# Patient Record
Sex: FEMALE | Race: ASIAN | NOT HISPANIC OR LATINO | URBAN - METROPOLITAN AREA
[De-identification: names, ages, dates, MRNs, and addresses within clinical notes are randomized per-mention and may not be internally consistent; named-entity substitution may affect disease eponyms.]

---

## 2022-12-09 ENCOUNTER — EMERGENCY (EMERGENCY)
Facility: HOSPITAL | Age: 27
LOS: 1 days | Discharge: HOME | End: 2022-12-09
Attending: EMERGENCY MEDICINE | Admitting: EMERGENCY MEDICINE
Payer: MEDICAID

## 2022-12-09 VITALS
OXYGEN SATURATION: 100 % | HEART RATE: 102 BPM | RESPIRATION RATE: 17 BRPM | DIASTOLIC BLOOD PRESSURE: 82 MMHG | SYSTOLIC BLOOD PRESSURE: 140 MMHG | TEMPERATURE: 98 F

## 2022-12-09 VITALS
OXYGEN SATURATION: 100 % | SYSTOLIC BLOOD PRESSURE: 136 MMHG | DIASTOLIC BLOOD PRESSURE: 88 MMHG | HEART RATE: 99 BPM | RESPIRATION RATE: 16 BRPM

## 2022-12-09 LAB
ALBUMIN SERPL ELPH-MCNC: 4.3 G/DL — SIGNIFICANT CHANGE UP (ref 3.3–5)
ALP SERPL-CCNC: 80 U/L — SIGNIFICANT CHANGE UP (ref 40–120)
ALT FLD-CCNC: 10 U/L — SIGNIFICANT CHANGE UP (ref 4–33)
ANION GAP SERPL CALC-SCNC: 10 MMOL/L — SIGNIFICANT CHANGE UP (ref 7–14)
APPEARANCE UR: CLEAR — SIGNIFICANT CHANGE UP
AST SERPL-CCNC: 13 U/L — SIGNIFICANT CHANGE UP (ref 4–32)
BACTERIA # UR AUTO: NEGATIVE — SIGNIFICANT CHANGE UP
BASE EXCESS BLDV CALC-SCNC: -0.3 MMOL/L — SIGNIFICANT CHANGE UP (ref -2–3)
BASOPHILS # BLD AUTO: 0.05 K/UL — SIGNIFICANT CHANGE UP (ref 0–0.2)
BASOPHILS NFR BLD AUTO: 0.5 % — SIGNIFICANT CHANGE UP (ref 0–2)
BILIRUB SERPL-MCNC: <0.2 MG/DL — SIGNIFICANT CHANGE UP (ref 0.2–1.2)
BILIRUB UR-MCNC: NEGATIVE — SIGNIFICANT CHANGE UP
BLOOD GAS VENOUS COMPREHENSIVE RESULT: SIGNIFICANT CHANGE UP
BUN SERPL-MCNC: 8 MG/DL — SIGNIFICANT CHANGE UP (ref 7–23)
CALCIUM SERPL-MCNC: 9.3 MG/DL — SIGNIFICANT CHANGE UP (ref 8.4–10.5)
CHLORIDE BLDV-SCNC: 105 MMOL/L — SIGNIFICANT CHANGE UP (ref 96–108)
CHLORIDE SERPL-SCNC: 104 MMOL/L — SIGNIFICANT CHANGE UP (ref 98–107)
CO2 BLDV-SCNC: 28.3 MMOL/L — HIGH (ref 22–26)
CO2 SERPL-SCNC: 22 MMOL/L — SIGNIFICANT CHANGE UP (ref 22–31)
COLOR SPEC: YELLOW — SIGNIFICANT CHANGE UP
CREAT SERPL-MCNC: 0.6 MG/DL — SIGNIFICANT CHANGE UP (ref 0.5–1.3)
DIFF PNL FLD: ABNORMAL
EGFR: 128 ML/MIN/1.73M2 — SIGNIFICANT CHANGE UP
EOSINOPHIL # BLD AUTO: 0.11 K/UL — SIGNIFICANT CHANGE UP (ref 0–0.5)
EOSINOPHIL NFR BLD AUTO: 1 % — SIGNIFICANT CHANGE UP (ref 0–6)
EPI CELLS # UR: 0 /HPF — SIGNIFICANT CHANGE UP (ref 0–5)
GAS PNL BLDV: 136 MMOL/L — SIGNIFICANT CHANGE UP (ref 136–145)
GLUCOSE BLDV-MCNC: 117 MG/DL — HIGH (ref 70–99)
GLUCOSE SERPL-MCNC: 110 MG/DL — HIGH (ref 70–99)
GLUCOSE UR QL: NEGATIVE — SIGNIFICANT CHANGE UP
HCG SERPL-ACNC: <5 MIU/ML — SIGNIFICANT CHANGE UP
HCG UR QL: NEGATIVE — SIGNIFICANT CHANGE UP
HCO3 BLDV-SCNC: 27 MMOL/L — SIGNIFICANT CHANGE UP (ref 22–29)
HCT VFR BLD CALC: 36.5 % — SIGNIFICANT CHANGE UP (ref 34.5–45)
HCT VFR BLDA CALC: 35 % — SIGNIFICANT CHANGE UP (ref 34.5–46.5)
HGB BLD CALC-MCNC: 11.8 G/DL — SIGNIFICANT CHANGE UP (ref 11.5–15.5)
HGB BLD-MCNC: 11.2 G/DL — LOW (ref 11.5–15.5)
HYALINE CASTS # UR AUTO: 1 /LPF — SIGNIFICANT CHANGE UP (ref 0–7)
IANC: 6.89 K/UL — SIGNIFICANT CHANGE UP (ref 1.8–7.4)
IMM GRANULOCYTES NFR BLD AUTO: 0.5 % — SIGNIFICANT CHANGE UP (ref 0–0.9)
KETONES UR-MCNC: NEGATIVE — SIGNIFICANT CHANGE UP
LACTATE BLDV-MCNC: 1.5 MMOL/L — SIGNIFICANT CHANGE UP (ref 0.5–2)
LEUKOCYTE ESTERASE UR-ACNC: NEGATIVE — SIGNIFICANT CHANGE UP
LIDOCAIN IGE QN: 31 U/L — SIGNIFICANT CHANGE UP (ref 7–60)
LYMPHOCYTES # BLD AUTO: 28.9 % — SIGNIFICANT CHANGE UP (ref 13–44)
LYMPHOCYTES # BLD AUTO: 3.14 K/UL — SIGNIFICANT CHANGE UP (ref 1–3.3)
MCHC RBC-ENTMCNC: 25.2 PG — LOW (ref 27–34)
MCHC RBC-ENTMCNC: 30.7 GM/DL — LOW (ref 32–36)
MCV RBC AUTO: 82 FL — SIGNIFICANT CHANGE UP (ref 80–100)
MONOCYTES # BLD AUTO: 0.63 K/UL — SIGNIFICANT CHANGE UP (ref 0–0.9)
MONOCYTES NFR BLD AUTO: 5.8 % — SIGNIFICANT CHANGE UP (ref 2–14)
NEUTROPHILS # BLD AUTO: 6.89 K/UL — SIGNIFICANT CHANGE UP (ref 1.8–7.4)
NEUTROPHILS NFR BLD AUTO: 63.3 % — SIGNIFICANT CHANGE UP (ref 43–77)
NITRITE UR-MCNC: NEGATIVE — SIGNIFICANT CHANGE UP
NRBC # BLD: 0 /100 WBCS — SIGNIFICANT CHANGE UP (ref 0–0)
NRBC # FLD: 0 K/UL — SIGNIFICANT CHANGE UP (ref 0–0)
PCO2 BLDV: 53 MMHG — HIGH (ref 39–42)
PH BLDV: 7.31 — LOW (ref 7.32–7.43)
PH UR: 6.5 — SIGNIFICANT CHANGE UP (ref 5–8)
PLATELET # BLD AUTO: 383 K/UL — SIGNIFICANT CHANGE UP (ref 150–400)
PO2 BLDV: 35 MMHG — SIGNIFICANT CHANGE UP
POTASSIUM BLDV-SCNC: 4.3 MMOL/L — SIGNIFICANT CHANGE UP (ref 3.5–5.1)
POTASSIUM SERPL-MCNC: 4.1 MMOL/L — SIGNIFICANT CHANGE UP (ref 3.5–5.3)
POTASSIUM SERPL-SCNC: 4.1 MMOL/L — SIGNIFICANT CHANGE UP (ref 3.5–5.3)
PROT SERPL-MCNC: 8 G/DL — SIGNIFICANT CHANGE UP (ref 6–8.3)
PROT UR-MCNC: ABNORMAL
RBC # BLD: 4.45 M/UL — SIGNIFICANT CHANGE UP (ref 3.8–5.2)
RBC # FLD: 13.9 % — SIGNIFICANT CHANGE UP (ref 10.3–14.5)
RBC CASTS # UR COMP ASSIST: 4 /HPF — SIGNIFICANT CHANGE UP (ref 0–4)
SAO2 % BLDV: 43.2 % — SIGNIFICANT CHANGE UP
SODIUM SERPL-SCNC: 136 MMOL/L — SIGNIFICANT CHANGE UP (ref 135–145)
SP GR SPEC: 1.02 — SIGNIFICANT CHANGE UP (ref 1.01–1.05)
UROBILINOGEN FLD QL: SIGNIFICANT CHANGE UP
WBC # BLD: 10.87 K/UL — HIGH (ref 3.8–10.5)
WBC # FLD AUTO: 10.87 K/UL — HIGH (ref 3.8–10.5)
WBC UR QL: 1 /HPF — SIGNIFICANT CHANGE UP (ref 0–5)

## 2022-12-09 PROCEDURE — 76830 TRANSVAGINAL US NON-OB: CPT | Mod: 26

## 2022-12-09 PROCEDURE — G1004: CPT

## 2022-12-09 PROCEDURE — 99202 OFFICE O/P NEW SF 15 MIN: CPT | Mod: GC

## 2022-12-09 PROCEDURE — 74177 CT ABD & PELVIS W/CONTRAST: CPT | Mod: 26,MG

## 2022-12-09 PROCEDURE — 99285 EMERGENCY DEPT VISIT HI MDM: CPT

## 2022-12-09 PROCEDURE — 93975 VASCULAR STUDY: CPT | Mod: 26

## 2022-12-09 RX ORDER — ONDANSETRON 8 MG/1
4 TABLET, FILM COATED ORAL ONCE
Refills: 0 | Status: COMPLETED | OUTPATIENT
Start: 2022-12-09 | End: 2022-12-09

## 2022-12-09 RX ORDER — SODIUM CHLORIDE 9 MG/ML
1000 INJECTION INTRAMUSCULAR; INTRAVENOUS; SUBCUTANEOUS ONCE
Refills: 0 | Status: COMPLETED | OUTPATIENT
Start: 2022-12-09 | End: 2022-12-09

## 2022-12-09 RX ORDER — ACETAMINOPHEN 500 MG
1000 TABLET ORAL ONCE
Refills: 0 | Status: COMPLETED | OUTPATIENT
Start: 2022-12-09 | End: 2022-12-09

## 2022-12-09 NOTE — ED PROVIDER NOTE - OBJECTIVE STATEMENT
27-year-old female, with no medical problems, last menstrual period November 7, presented to the ER complaining right-sided abdominal pain for 4-5 days associated with nausea.  Reports having sharp intermittent pain, worse at nighttime, no relieving factors.  Admits she is trying  to conceive with her , tested positive for pregnancy test last month with delayed menstrual by 10 days, then tested negative.  Reports having bilateral low back pain with intermittent radiating down the lower leg.  Admits having cramping pain lower abdomen similar to previous menstrual periods admits going through 2 pads a day.    Pt declined free Syriac translation; preferred  to translate at bedside. 27-year-old female, with no medical problems, last menstrual period November 7, presented to the ER complaining right-sided abdominal pain for 4-5 days associated with nausea.  Reports having sharp intermittent pain, worse at nighttime, no relieving factors.  Admits she is trying  to conceive with her , tested positive for pregnancy test last month with delayed menstrual by 10 days, then tested negative.  Reports having bilateral low back pain with intermittent radiating down the lower leg.  Admits having cramping pain lower abdomen similar to previous menstrual periods admits going through 2 pads a day.    Pt declined free Mohawk translation; preferred  to translate at bedside.    Attending/Joaquim: 26 yo F LMP 12/6/22 p/w ~four days of intermittent Rt-sided abd pain. Pain is described as sharp, exacerbated with movement. Denies associated with f3fer/chills, n/v/d. Further denies dysuria,. Currently is menstruation.

## 2022-12-09 NOTE — ED PROVIDER NOTE - NEURO NEGATIVE STATEMENT, MLM
no loss of consciousness, no gait abnormality, no headache, no sensory deficits, and no weakness, no numbness, no urinary/fecal incontinence, no saddle anesthesia.

## 2022-12-09 NOTE — ED ADULT NURSE NOTE - OBJECTIVE STATEMENT
Patient received to room getachew Robles complaining of abdominal pain and admitting to wanting to try for pregnancy but having heavy bleeding.

## 2022-12-09 NOTE — ED PROVIDER NOTE - NS ED ATTENDING STATEMENT MOD
This was a shared visit with the CIRA. I reviewed and verified the documentation and independently performed the documented:

## 2022-12-09 NOTE — ED PROVIDER NOTE - PROGRESS NOTE DETAILS
RYAN PABON: Patient reassessed, sitting comfortably in chair in NAD, denies any complaints. States feeling better, symptoms improved. CT shows 3.8cm right ovarian cyst, normal appendix. TVUS shows 3.8cm right ovarian cyst, no sonographic of torsion. OBGYN consulted, no concern for torsion at this time, recommend f/u with outpatient obygn. The patient was given verbal and written discharge instructions. Specifically, instructions when to return to the ED and when to seek follow-up from their pcp was discussed. Any specialty follow-up was discussed, including how to make an appointment.  Instructions were discussed in simple, plain language and was understood by the patient. The patient understands that should their symptoms worsen or any new symptoms arise, they should return to the ED immediately for further evaluation. All pt's questions were answered. Patient verbalizes understanding.   Translated by  at bedside.

## 2022-12-09 NOTE — CONSULT NOTE ADULT - SUBJECTIVE AND OBJECTIVE BOX
Gyn Consult Note  MADI MATIAS  27y  Female [id number]    HPI:      Name of GYN Physician:     OBHx:    GYNHx: Denies fibroids, cysts, endometriosis, STI's, Abnormal pap smears   PMH:  PSH:  Meds:  All:  Soc:    accepts blood    Physical Exam:   General: sitting comfortably in bed, NAD   CV: RRR  Lungs: CTAB  Back: No CVA tenderness  Abd: Soft, non-tender, non-distended.  Bowel sounds present.    :  No bleeding on pad.    External labia wnl.  Bimanual exam with no cervical motion tenderness, uterus wnl, adnexa non palpable b/l.  Cervix closed vs. Cervix dilated    cm   Speculum Exam: No active bleeding from os.  Physiologic discharge.    Ext: non-tender b/l, no edema     LABS:      Pregnancy Profile, Urine: Negative (22 @ 14:38)                          11.2   10.87 )-----------( 383      ( 09 Dec 2022 14:38 )             36.5         136  |  104  |  8   ----------------------------<  110<H>  4.1   |  22  |  0.60    Ca    9.3      09 Dec 2022 14:38    TPro  8.0  /  Alb  4.3  /  TBili  <0.2  /  DBili  x   /  AST  13  /  ALT  10  /  AlkPhos  80      I&O's Detail      Urinalysis Basic - ( 09 Dec 2022 14:38 )    Color: Yellow / Appearance: Clear / S.020 / pH: x  Gluc: x / Ketone: Negative  / Bili: Negative / Urobili: <2 mg/dL   Blood: x / Protein: Trace / Nitrite: Negative   Leuk Esterase: Negative / RBC: 4 /HPF / WBC 1 /HPF   Sq Epi: x / Non Sq Epi: 0 /HPF / Bacteria: Negative        RADIOLOGY & ADDITIONAL STUDIES:  < from: US Transvaginal (22 @ 15:14) >  US DPLX PELVIC                        ACC: [id number] EXAM:  US TRANSVAGINAL                          PROCEDURE DATE:  2022          INTERPRETATION:  CLINICAL INFORMATION: Right lower quadrant pain. Rule   out torsion.    LMP: 2022.    COMPARISON: None available.    TECHNIQUE:  Endovaginal pelvic sonogram only. Color and Spectral Doppler was   performed.    FINDINGS:  Uterus: 6.7 cm x 3.2 cm x 4.9 cm. Within normal limits.  Endometrium: 3 mm. Within normal limits.    Right ovary: 4.9 cm x 2.5 cm x 4.0 cm. A 3.8 x 2.3 x 2.9 cm simple right   adnexal cyst. Normal arterial and venous waveforms.  Left ovary: 2.9 cm x 1.7 cm x 1.5 cm. Within normal limits. Normal   arterial and venous waveforms.    Fluid: Trace right adnexal free fluid.    IMPRESSION:  A 3.8 cm simple right adnexal cyst. Normal Doppler flow to both ovaries   without sonographic evidence of torsion.        --- End of Report ---            MANJU POWERS DO; Attending Radiologist  This document has been electronically signed. Dec  9 2022  3:28PM    < end of copied text >    A/P:    REJI Dill, PGY-1  Gyn Consult Note  MADI MATIAS  27y  Female [id number]    Maltese speaking - refusing ,  translated this interaction    HPI: 26yo G0 LMP 12/6 presenting with 5 days of intermittent sharp RLQ radiating around right flank. At its worst, reports 8/10 severity associated with nausea. It then improves to 3-4 in severity. At this time, patient reports no pain. She has not taken any pain medication as she and  are trying to conceive and she is concerned pain medication would harm the baby. She has had normal monthly menses since menarche and has never seen a GYN. She reports possible chemical pregnancy last month, in which menses was 1 week late, with positive home UPT.       Name of GYN Physician: None    OBHx: Possible chemical pregnancy   GYNHx: Regular menses  PMH: Denies   PSH: Denies   Meds: Denies   All: Denies   Soc: Moved to [place] 8 months ago from [facility]    accepts blood    Physical Exam:   General: sitting comfortably in bed, NAD   Back: No CVA tenderness  Abd: Soft, non-tender, non-distended. No rebound/guarding.  :  Mild bleeding on pad, menstruating. External labia wnl.  Bimanual exam with no cervical motion tenderness, uterus wnl, adnexa non palpable b/l.  Cervix closed.   Ext: non-tender b/l, no edema     LABS:      Pregnancy Profile, Urine: Negative (22 @ 14:38)                          11.2   10.87 )-----------( 383      ( 09 Dec 2022 14:38 )             36.5         136  |  104  |  8   ----------------------------<  110<H>  4.1   |  22  |  0.60    Ca    9.3      09 Dec 2022 14:38    TPro  8.0  /  Alb  4.3  /  TBili  <0.2  /  DBili  x   /  AST  13  /  ALT  10  /  AlkPhos  80      I&O's Detail      Urinalysis Basic - ( 09 Dec 2022 14:38 )    Color: Yellow / Appearance: Clear / S.020 / pH: x  Gluc: x / Ketone: Negative  / Bili: Negative / Urobili: <2 mg/dL   Blood: x / Protein: Trace / Nitrite: Negative   Leuk Esterase: Negative / RBC: 4 /HPF / WBC 1 /HPF   Sq Epi: x / Non Sq Epi: 0 /HPF / Bacteria: Negative        RADIOLOGY & ADDITIONAL STUDIES:  < from: US Transvaginal (22 @ 15:14) >  US DPLX PELVIC                        ACC: [id number] EXAM:  US TRANSVAGINAL                          PROCEDURE DATE:  2022          INTERPRETATION:  CLINICAL INFORMATION: Right lower quadrant pain. Rule   out torsion.    LMP: 2022.    COMPARISON: None available.    TECHNIQUE:  Endovaginal pelvic sonogram only. Color and Spectral Doppler was   performed.    FINDINGS:  Uterus: 6.7 cm x 3.2 cm x 4.9 cm. Within normal limits.  Endometrium: 3 mm. Within normal limits.    Right ovary: 4.9 cm x 2.5 cm x 4.0 cm. A 3.8 x 2.3 x 2.9 cm simple right   adnexal cyst. Normal arterial and venous waveforms.  Left ovary: 2.9 cm x 1.7 cm x 1.5 cm. Within normal limits. Normal   arterial and venous waveforms.    Fluid: Trace right adnexal free fluid.    IMPRESSION:  A 3.8 cm simple right adnexal cyst. Normal Doppler flow to both ovaries   without sonographic evidence of torsion.        --- End of Report ---            MANJU POWERS DO; Attending Radiologist  This document has been electronically signed. Dec  9 2022  3:28PM    < end of copied text >

## 2022-12-09 NOTE — ED ADULT TRIAGE NOTE - CHIEF COMPLAINT QUOTE
pt c/o R sided abd pain x 4 days with nausea radiating to the back. denies dysuria. LMP is at present.

## 2022-12-09 NOTE — ED PROVIDER NOTE - PATIENT PORTAL LINK FT
You can access the FollowMyHealth Patient Portal offered by [facility] by registering at the following website: [email or web address]. By joining [facility]’s FollowMyHealth portal, you will also be able to view your health information using other applications (apps) compatible with our system.

## 2022-12-09 NOTE — CONSULT NOTE ADULT - ATTENDING COMMENTS
Pt seen and examined for r/o torsion.  At time of evaluation pt reports she had "no pain".  Pt declined pain meds in the ED.  On exam abdomen nontender, no rebound, no guarding.  USN findings reviewed with pt and .  Discussed pain/ torsion precautions and follow up.  All questions and concerns addressed.  Will give contact info for GYN that takes pt's insurance.      Corinne Mendez MD Pt seen and examined for r/o torsion.  At time of evaluation pt reports she has "no pain".  Pt declined pain meds in the ED.  On exam abdomen nontender, no rebound, no guarding, VSS.  USN findings of small simple cyst,  reviewed with pt and .  Discussed pain/ torsion precautions and follow up.  All questions and concerns addressed.  Will give contact info for GYN that takes pt's insurance.      Corinne Mendez MD

## 2022-12-09 NOTE — ED PROVIDER NOTE - PHYSICAL EXAMINATION
pelvic exam: chaperone by Daria Meraz RN.   cervix with bleeding, mild clot, non-pooling, tender right adnexal, no discharge pelvic exam: chaperone by Daria Meraz RN.   cervix with bleeding, mild clot, non-pooling, tender right adnexal, no discharge    Attending/Joaquim: NAD; PERRL/EOMI, non-icterus, supple, no JESSE, no JVD, RRR, CTAB; Abd-soft, Rt-sided PT, no guarding or rebound, no HSM; no LE edema, A&Ox3, nonfocal; Skin-warm/dry  Pelvic Ex-as per RYAN Loza

## 2022-12-09 NOTE — ED PROVIDER NOTE - CLINICAL SUMMARY MEDICAL DECISION MAKING FREE TEXT BOX
27-year-old female, with no medical problems, last menstrual period November 7, presented to the ER complaining right-sided abdominal pain for 4-5 days associated with nausea. 27-year-old female, with no medical problems, last menstrual period November 7, presented to the ER complaining right-sided abdominal pain for 4-5 days associated with nausea. well appearing female. afebrile. Ucg negative. suspicion for acute appendicitis. R/o torsion. Ddx: lower back pain w/ disc herniation, menstrual pain. Plan: labs, Ua, CT A/P to r/o acute appendicitis, TVUS to r/o torsion, pain control, and reassess.

## 2022-12-09 NOTE — ED PROVIDER NOTE - NSFOLLOWUPINSTRUCTIONS_ED_ALL_ED_FT
Follow up with your PMD within 48-72 hrs. Rest, increase your fluids. No heavy lifting. Refrain from sexual intercourse.  Worsening pain, vaginal bleeding, vomiting, dizziness, weakness or ANY NEW CONCERNING SYMPTOMS return to the emergency department.   Take Tylenol 650mg (Two 325 mg pills) every 4-6 hours as needed for pain.   Take Motrin 600 mg every 6-8 hours as needed for moderate pain -- take with food.     Our Discharge Lounge will contact you for appointment for OBGYN.

## 2022-12-09 NOTE — CONSULT NOTE ADULT - ASSESSMENT
A/P: 26yo P0 LMP 12/6 presenting for 5 days of intermittent, sharp RLQ pain, which has now resolved. TVUS showing 3.8x2.3x2.9cm simple right adnexal cyst without evidence of torsion. Low suspicion for torsed ovary at this time, given  benign abdominal exam, small size of cyst, and stable vital signs.  - Patient should follow up with outpatient GYN for monitoring of cyst, preconception counseling  - Dr. Lopez Zapata and Dr. Laith Hobson ([phone]) take pt's insurance, please give names to patient   - No acute GYN intervention    Seen and d/w Dr. Mendez, Ob/Gyn Service Attg  REJI Dill, PGY2

## 2022-12-10 LAB
CULTURE RESULTS: SIGNIFICANT CHANGE UP
SPECIMEN SOURCE: SIGNIFICANT CHANGE UP

## 2023-08-07 ENCOUNTER — OUTPATIENT (OUTPATIENT)
Dept: INPATIENT UNIT | Facility: HOSPITAL | Age: 28
LOS: 1 days | Discharge: HOME | End: 2023-08-07
Payer: MEDICAID

## 2023-08-07 ENCOUNTER — EMERGENCY (EMERGENCY)
Facility: HOSPITAL | Age: 28
LOS: 1 days | Discharge: STILL ADMITTED OR ADMINISTRATIVE | End: 2023-08-07
Admitting: EMERGENCY MEDICINE
Payer: MEDICAID

## 2023-08-07 VITALS
RESPIRATION RATE: 18 BRPM | SYSTOLIC BLOOD PRESSURE: 129 MMHG | DIASTOLIC BLOOD PRESSURE: 76 MMHG | TEMPERATURE: 99 F | OXYGEN SATURATION: 97 % | HEART RATE: 110 BPM

## 2023-08-07 DIAGNOSIS — O26.899 OTHER SPECIFIED PREGNANCY RELATED CONDITIONS, UNSPECIFIED TRIMESTER: ICD-10-CM

## 2023-08-07 PROCEDURE — 59025 FETAL NON-STRESS TEST: CPT | Mod: 26

## 2023-08-07 PROCEDURE — 99221 1ST HOSP IP/OBS SF/LOW 40: CPT | Mod: 25

## 2023-08-07 PROCEDURE — L9996: CPT

## 2023-08-07 NOTE — ED ADULT TRIAGE NOTE - NS ED NURSE NOTE DISPO AOU
"1105:     DATA:      Therapist met individually with patient this date. Staffed case with Dr. Cervantes and present during his evaluation this date. Patient focused on discharge and returning home.  Dr. Cervantes recommending patient attend [facility] program through [facility] for daily therapy.  Therapist contacted guardian and reviewed patient's discharge and recommendations.  Guardian stated that the \"[facility] program is probably no good\", but agreed to consider it as an option.  Therapist recommended 24/7 monitoring due to patient's high risk impulsive behaviors in the past.  Guardian agreeable.       ASSESSMENT:     Patient observed to have appropriate affect and congruent mood.  Patient denies SI/HI and is oriented x 4.  Patient has had no hyper-sexual behaviors on unit.  She continues to have some intrusive behaviors due to low cognitive functioning.     Plan:    Patient to discharge home to guardian this date.     Aftercare scheduled with Jde CHAUDHARY.  We have also completed referral for the [facility] program.   " AOU-L&D

## 2023-08-07 NOTE — ED ADULT TRIAGE NOTE - CHIEF COMPLAINT QUOTE
Pt c/o lower abdominal pain, lower back pain since this morning. Pt is approx 30 weeks pregnant. Denies vaginal bleeding.     Spoke to Bianca in L&D, pt to be seen in L&D

## 2023-08-07 NOTE — ED ADULT TRIAGE NOTE - PRO INTERPRETER NEED 2
40y  LMP  presents POD#5 sp lsc L salpingectomy for ectopic pregnancy with mild back pain.  Patient is hemodynamically stable and without pain on exam.  There are no signs of infection or bleeding.  Patient has had complete return of bowel function.  Pain is most likely normal post-operative pain. English

## 2023-08-08 VITALS
SYSTOLIC BLOOD PRESSURE: 116 MMHG | RESPIRATION RATE: 16 BRPM | HEART RATE: 96 BPM | TEMPERATURE: 98 F | DIASTOLIC BLOOD PRESSURE: 63 MMHG

## 2023-08-08 VITALS — DIASTOLIC BLOOD PRESSURE: 64 MMHG | HEART RATE: 111 BPM | SYSTOLIC BLOOD PRESSURE: 109 MMHG

## 2023-08-08 LAB
APPEARANCE UR: ABNORMAL
B PERT DNA SPEC QL NAA+PROBE: SIGNIFICANT CHANGE UP
B PERT+PARAPERT DNA PNL SPEC NAA+PROBE: SIGNIFICANT CHANGE UP
BACTERIA # UR AUTO: ABNORMAL /HPF
BILIRUB UR-MCNC: NEGATIVE — SIGNIFICANT CHANGE UP
BORDETELLA PARAPERTUSSIS (RAPRVP): SIGNIFICANT CHANGE UP
C PNEUM DNA SPEC QL NAA+PROBE: SIGNIFICANT CHANGE UP
CAST: 0 /LPF — SIGNIFICANT CHANGE UP (ref 0–4)
COLOR SPEC: YELLOW — SIGNIFICANT CHANGE UP
DIFF PNL FLD: NEGATIVE — SIGNIFICANT CHANGE UP
FLUAV SUBTYP SPEC NAA+PROBE: SIGNIFICANT CHANGE UP
FLUBV RNA SPEC QL NAA+PROBE: DETECTED
GLUCOSE UR QL: NEGATIVE MG/DL — SIGNIFICANT CHANGE UP
HADV DNA SPEC QL NAA+PROBE: SIGNIFICANT CHANGE UP
HCOV 229E RNA SPEC QL NAA+PROBE: SIGNIFICANT CHANGE UP
HCOV HKU1 RNA SPEC QL NAA+PROBE: SIGNIFICANT CHANGE UP
HCOV NL63 RNA SPEC QL NAA+PROBE: SIGNIFICANT CHANGE UP
HCOV OC43 RNA SPEC QL NAA+PROBE: SIGNIFICANT CHANGE UP
HMPV RNA SPEC QL NAA+PROBE: SIGNIFICANT CHANGE UP
HPIV1 RNA SPEC QL NAA+PROBE: SIGNIFICANT CHANGE UP
HPIV2 RNA SPEC QL NAA+PROBE: SIGNIFICANT CHANGE UP
HPIV3 RNA SPEC QL NAA+PROBE: SIGNIFICANT CHANGE UP
HPIV4 RNA SPEC QL NAA+PROBE: SIGNIFICANT CHANGE UP
KETONES UR-MCNC: ABNORMAL MG/DL
LEUKOCYTE ESTERASE UR-ACNC: ABNORMAL
M PNEUMO DNA SPEC QL NAA+PROBE: SIGNIFICANT CHANGE UP
NITRITE UR-MCNC: NEGATIVE — SIGNIFICANT CHANGE UP
PH UR: 6 — SIGNIFICANT CHANGE UP (ref 5–8)
PROT UR-MCNC: SIGNIFICANT CHANGE UP MG/DL
RAPID RVP RESULT: DETECTED
RBC CASTS # UR COMP ASSIST: 5 /HPF — HIGH (ref 0–4)
REVIEW: SIGNIFICANT CHANGE UP
RSV RNA SPEC QL NAA+PROBE: SIGNIFICANT CHANGE UP
RV+EV RNA SPEC QL NAA+PROBE: SIGNIFICANT CHANGE UP
SARS-COV-2 RNA SPEC QL NAA+PROBE: SIGNIFICANT CHANGE UP
SP GR SPEC: 1.02 — SIGNIFICANT CHANGE UP (ref 1–1.03)
SQUAMOUS # UR AUTO: 8 /HPF — HIGH (ref 0–5)
UROBILINOGEN FLD QL: 1 MG/DL — SIGNIFICANT CHANGE UP (ref 0.2–1)
WBC UR QL: 18 /HPF — HIGH (ref 0–5)

## 2023-08-08 RX ORDER — FERROUS GLUCONATE 100 %
1 POWDER (GRAM) MISCELLANEOUS
Refills: 0 | DISCHARGE

## 2023-08-08 NOTE — OB PROVIDER TRIAGE NOTE - NSHPLABSRESULTS_GEN_ALL_CORE
Urinalysis Basic - ( 08 Aug 2023 04:37 )    Color: Yellow / Appearance: Cloudy / S.021 / pH: x  Gluc: x / Ketone: Trace mg/dL  / Bili: Negative / Urobili: 1.0 mg/dL   Blood: x / Protein: Trace mg/dL / Nitrite: Negative   Leuk Esterase: Small / RBC: 5 /HPF / WBC 18 /HPF   Sq Epi: x / Non Sq Epi: 8 /HPF / Bacteria: Moderate /HPF

## 2023-08-08 NOTE — OB PROVIDER TRIAGE NOTE - HISTORY OF PRESENT ILLNESS
27 y/o  GDMA1 at 30.2 weeks presents with lower abdominal pain and tightness. Also reports fever, chills, body aches. States a few family members are currently sick with the flu. Denies leakage of clear fluid, vaginal bleeding, chest pain, shortness of breath. Reports good fetal movement.  Patient gets PNC with Dr. Giron affiliated with [facility].

## 2023-08-08 NOTE — OB RN TRIAGE NOTE - NS_OBGYNHISTORY_OBGYN_ALL_OB_FT
Group Topic: BH Education    Date: 7/12/2021  Start Time:  8:00 PM  End Time:  8:50 PM  Facilitators: Taran Unger LPC; Antonio Funes LCPC    Focus: The group was educated on Maladaptive coping skills. The group looked at maladaptive coping skills and some adaptive coping skills that can be used to replace the maladaptive one.   Number in attendance: 9  This group was done via virtual therapy on zoom due to covid 19.     The patient is recommended to continue with the virtual program.     Method: Group  Attendance: Present  Participation: Moderate  Patient Response: Asked questions and Attentive  Mood: Anxious  Affect: Type: Anxious   Range: Blunted/flat   Congruency: Congruent   Stability: Labile  Behavior/Socialization: Appropriate to group and Cooperative  Thought Process: Focused  Task Performance: Follows directions  Patient Evaluation: Encouragement - needs prompts       Right ovarian cyst

## 2023-08-08 NOTE — OB PROVIDER TRIAGE NOTE - NSHPPHYSICALEXAM_GEN_ALL_CORE
Vital Signs Last 24 Hrs  T(C): 37.1 (08 Aug 2023 03:46), Max: 37.2 (07 Aug 2023 23:31)  T(F): 98.78 (08 Aug 2023 03:46), Max: 98.9 (07 Aug 2023 23:31)  HR: 105 (08 Aug 2023 03:49) (96 - 110)  BP: 108/61 (08 Aug 2023 03:49) (108/61 - 129/76)  BP(mean): --  RR: 16 (08 Aug 2023 01:47) (16 - 18)  SpO2: 97% (07 Aug 2023 23:31) (97% - 97%)    A&O x 3  Lungs: breathing comfortably on RA  Abd: gravid, soft nontender to palpation  EFM: baseline 150, moderate variability, + accels, no decels, irregular ctx - resolved  reactive NST  SSE: cervix appears closed  TAS: breech presentation, anterior placenta, BPP 8/8 KO 15.51  TVS: CL 3.7-4.1, no funneling or dynamic changes  Images saved on ASOB

## 2023-08-08 NOTE — OB PROVIDER TRIAGE NOTE - NSOBPROVIDERNOTE_OBGYN_ALL_OB_FT
27 y/o  GDMA1 at 30.2 weeks presents with lower abdominal pain and tightness, reports fever, chills, body aches  - UA and RVP sent  - Vitals stable  - Reactive NST, BPP  27 y/o  GDMA1 at 30.2 weeks presents with lower abdominal pain and tightness, reports fever, chills, body aches  - UA, urine culture and RVP sent  - Vitals stable  - Reactive NST, BPP  29 y/o  GDMA1 at 30.2 weeks presents with lower abdominal pain and tightness, reports fever, chills, body aches  - UA, urine culture and RVP sent  - Vitals stable  - Reactive NST, BPP   - Patient reports pain has improved 29 y/o  GDMA1 at 30.2 weeks presents with lower abdominal pain and tightness, reports fever, chills, body aches  - Vitals stable, afebrile  - No evidence of PTL at this time  - UA, urine culture and RVP sent  - Reactive NST, BPP   - Patient reports pain has improved  - Patient stable for discharge home with adequate outpatient follow up  - Instructed patient to follow up with OB/GYN within 1 week  - Educated and discussed  labor precautions  - Educated and discussed concerning signs/symptoms to call OB/GYN and return to L&D including but not limited to vaginal bleeding, leakage of fluid, painful contractions, decreased fetal movement, fever/chills, shortness of breath, chest pain, rash, difficulty ambulating, nausea/vomiting/diarrhea, worsening of symptoms  - Patient states she understands and agrees with assessment and plan, all questions answered  - Discussed with Dr. Marshall  - Patient discharged at 06:09am

## 2023-08-08 NOTE — OB PROVIDER TRIAGE NOTE - ADDITIONAL INSTRUCTIONS
- Patient stable for discharge home with adequate outpatient follow up  - Instructed patient to follow up with OB/GYN within 1 week  - Educated and discussed  labor precautions  - Educated and discussed concerning signs/symptoms to call OB/GYN and return to L&D including but not limited to vaginal bleeding, leakage of fluid, painful contractions, decreased fetal movement, fever/chills, shortness of breath, chest pain, rash, difficulty ambulating, nausea/vomiting/diarrhea, worsening of symptoms  - Patient states she understands and agrees with assessment and plan, all questions answered

## 2023-08-09 DIAGNOSIS — R50.9 FEVER, UNSPECIFIED: ICD-10-CM

## 2023-08-09 DIAGNOSIS — N83.201 UNSPECIFIED OVARIAN CYST, RIGHT SIDE: ICD-10-CM

## 2023-08-09 DIAGNOSIS — Z3A.30 30 WEEKS GESTATION OF PREGNANCY: ICD-10-CM

## 2023-08-09 DIAGNOSIS — R10.30 LOWER ABDOMINAL PAIN, UNSPECIFIED: ICD-10-CM

## 2023-08-09 DIAGNOSIS — O99.891 OTHER SPECIFIED DISEASES AND CONDITIONS COMPLICATING PREGNANCY: ICD-10-CM

## 2023-08-09 DIAGNOSIS — O26.893 OTHER SPECIFIED PREGNANCY RELATED CONDITIONS, THIRD TRIMESTER: ICD-10-CM

## 2023-08-09 DIAGNOSIS — O99.013 ANEMIA COMPLICATING PREGNANCY, THIRD TRIMESTER: ICD-10-CM

## 2023-08-09 DIAGNOSIS — D64.9 ANEMIA, UNSPECIFIED: ICD-10-CM

## 2023-08-10 LAB
CULTURE RESULTS: SIGNIFICANT CHANGE UP
SPECIMEN SOURCE: SIGNIFICANT CHANGE UP